# Patient Record
Sex: MALE | Race: WHITE | Employment: OTHER | URBAN - METROPOLITAN AREA
[De-identification: names, ages, dates, MRNs, and addresses within clinical notes are randomized per-mention and may not be internally consistent; named-entity substitution may affect disease eponyms.]

---

## 2018-06-19 ENCOUNTER — OFFICE VISIT (OUTPATIENT)
Dept: FAMILY MEDICINE | Facility: CLINIC | Age: 28
End: 2018-06-19
Payer: MEDICARE

## 2018-06-19 VITALS
RESPIRATION RATE: 18 BRPM | DIASTOLIC BLOOD PRESSURE: 81 MMHG | WEIGHT: 219.2 LBS | HEIGHT: 63 IN | BODY MASS INDEX: 38.84 KG/M2 | OXYGEN SATURATION: 100 % | HEART RATE: 99 BPM | TEMPERATURE: 97.5 F | SYSTOLIC BLOOD PRESSURE: 116 MMHG

## 2018-06-19 DIAGNOSIS — L60.3 DYSTROPHIC NAIL: Primary | ICD-10-CM

## 2018-06-19 DIAGNOSIS — Z71.1 WORRIED WELL: ICD-10-CM

## 2018-06-19 NOTE — MR AVS SNAPSHOT
"              After Visit Summary   6/19/2018    Darrell Bravo    MRN: [id number]           Patient Information     Date Of Birth          1990        Visit Information        Provider Department      6/19/2018 11:20 AM Isaac Torres MD [facility]        Today's Diagnoses     Dystrophic nail    -  1    Worried well           Follow-ups after your visit        Who to contact     Please call your clinic at [phone] to:    Ask questions about your health    Make or cancel appointments    Discuss your medicines    Learn about your test results    Speak to your doctor            Additional Information About Your Visit        Care EveryWhere ID     This is your Care EveryWhere ID. This could be used by other organizations to access your [facility] medical records  [id number]        Your Vitals Were     Pulse Temperature Respirations Height Pulse Oximetry BMI (Body Mass Index)    99 97.5  F (36.4  C) 18 5' 3\" (160 cm) 100% 38.83 kg/m2       Blood Pressure from Last 3 Encounters:   06/19/18 116/81   07/27/16 122/82   04/13/16 124/75    Weight from Last 3 Encounters:   06/19/18 219 lb 3.2 oz (99.4 kg)   07/27/16 220 lb 8 oz (100 kg)   04/13/16 181 lb 12.8 oz (82.5 kg)              We Performed the Following     TRIMMING DYSTROPHIC NAILS,ANY NUMBER        Primary Care Provider Office Phone # Fax #    Nallely Geovanna Hernandez [phone]       [facility] [address] [place] [address]        Equal Access to Services     BOBY RUBALCAVA AH: Hadii aad ku hadasho Soomaali, waaxda luqadaha, qaybta kaalmada adeegyada, waxluana mitchellin hayaan lyric solo la'aan . So [facility] [phone].    ATENCIÓN: Si habla mauricioañol, tiene a ingram disposición servicios gratuitos de asistencia lingüística. Llame al [phone].    We comply with applicable federal civil rights laws and [place] laws. We do not discriminate on the basis of race, color, national origin, age, disability, sex, sexual " orientation, or gender identity.            Thank you!     Thank you for choosing [facility]  for your care. Our goal is always to provide you with excellent care. Hearing back from our patients is one way we can continue to improve our services. Please take a few minutes to complete the written survey that you may receive in the mail after your visit with us. Thank you!             Your Updated Medication List - Protect others around you: Learn how to safely use, store and throw away your medicines at www.disposemymeds.org.          This list is accurate as of 6/19/18 11:59 PM.  Always use your most recent med list.                   Brand Name Dispense Instructions for use Diagnosis    loperamide 2 MG tablet    IMODIUM A-D    30 tablet    Start with 2 tabs (4 mg), then take one tab (2 mg) after each diarrheal stool.  Do not use more than  8 tabs (16 mg) per day.    Diarrhea       loratadine 10 MG tablet    CLARITIN    30 tablet    Take 1 tablet (10 mg) by mouth daily    Allergic rhinitis due to pollen

## 2018-06-19 NOTE — PROGRESS NOTES
Preceptor Attestation:  Patient's case reviewed and discussed with  Patient seen and discussed with the resident..  I agree with written assessment and plan of care.  Supervising Physician:  Geetha Manuel MD  [facility]

## 2021-05-24 ENCOUNTER — RECORDS - HEALTHEAST (OUTPATIENT)
Dept: ADMINISTRATIVE | Facility: CLINIC | Age: 31
End: 2021-05-24

## 2021-05-31 ENCOUNTER — RECORDS - HEALTHEAST (OUTPATIENT)
Dept: ADMINISTRATIVE | Facility: CLINIC | Age: 31
End: 2021-05-31

## 2022-02-09 ENCOUNTER — OFFICE VISIT (OUTPATIENT)
Dept: OTOLARYNGOLOGY | Facility: CLINIC | Age: 32
End: 2022-02-09
Payer: MEDICARE

## 2022-02-09 ENCOUNTER — OFFICE VISIT (OUTPATIENT)
Dept: AUDIOLOGY | Facility: CLINIC | Age: 32
End: 2022-02-09
Payer: MEDICARE

## 2022-02-09 VITALS — RESPIRATION RATE: 16 BRPM | OXYGEN SATURATION: 99 % | HEART RATE: 98 BPM

## 2022-02-09 DIAGNOSIS — H61.23 BILATERAL IMPACTED CERUMEN: Primary | ICD-10-CM

## 2022-02-09 DIAGNOSIS — Z53.9 ERRONEOUS ENCOUNTER--DISREGARD: Primary | ICD-10-CM

## 2022-02-09 PROCEDURE — 69210 REMOVE IMPACTED EAR WAX UNI: CPT | Performed by: OTOLARYNGOLOGY

## 2022-02-09 NOTE — LETTER
2/9/2022         RE: Darrell Bravo  [address] [place] [address]        Dear Colleague,    Thank you for referring your patient, Darrell Bravo, to the [facility]. Please see a copy of my visit note below.    Cerumen Removal - Patient here for ear cleaning. Denies ear pain, drainage, and infection.     Physical Exam and Procedure  Ears - On examination of the ears, I found that both ears were impacted with cerumen.  Therefore, I positioned the patient in the examination chair in a semi-supine position. I used the binocular surgical microscope to perform cerumen removal.  On the right side, I began by using a suction to gently lift the edges of the cerumen mass away from the walls of the external canal. Once I did this, I was able to pull away fragments of wax and debris. I also used an alligator. I removed all the wax and debri. The tympanic membrane was intact, no sign of perforation or middle ear effusion.    I turned my attention to the left side once again using the binocular surgical microscope to perform cerumen removal.  I began by using a suction to gently lift the edges of the cerumen mass away from the walls of the external canal.  Once I did this, I was able to suction away fragments of wax and debris. I removed all the wax and debri. The tympanic membrane was intact, no sign of perforation or middle ear effusion.    A/P - bilateral cerumen impaction. Both ears cleaned today in clinic. Return prn.         Again, thank you for allowing me to participate in the care of your patient.        Sincerely,        Alfonso Prado MD

## 2022-02-09 NOTE — PROGRESS NOTES
Cerumen Removal - Patient here for ear cleaning. Denies ear pain, drainage, and infection.     Physical Exam and Procedure  Ears - On examination of the ears, I found that both ears were impacted with cerumen.  Therefore, I positioned the patient in the examination chair in a semi-supine position. I used the binocular surgical microscope to perform cerumen removal.  On the right side, I began by using a suction to gently lift the edges of the cerumen mass away from the walls of the external canal. Once I did this, I was able to pull away fragments of wax and debris. I also used an alligator. I removed all the wax and debri. The tympanic membrane was intact, no sign of perforation or middle ear effusion.    I turned my attention to the left side once again using the binocular surgical microscope to perform cerumen removal.  I began by using a suction to gently lift the edges of the cerumen mass away from the walls of the external canal.  Once I did this, I was able to suction away fragments of wax and debris. I removed all the wax and debri. The tympanic membrane was intact, no sign of perforation or middle ear effusion.    A/P - bilateral cerumen impaction. Both ears cleaned today in clinic. Return prn.

## 2022-02-09 NOTE — PROGRESS NOTES
AUDIOLOGY REPORT:    Patient was referred to [facility] [place] [facility]iology from ENT by Dr. Prado for a hearing examination. Patient's mother reports decline in bilateral hearing, but feels it may be a wax build up. They were accompanied today by their brother and mother.    Testing:    Otoscopy:   Otoscopic exam indicates cerumen occlusion bilaterally       Patient was returned to ENT.     Claritza Coto, CCC-A  Licensed Audiologist  [place] #[id number]    02/09/22

## 2022-02-18 ENCOUNTER — OFFICE VISIT (OUTPATIENT)
Dept: PODIATRY | Facility: CLINIC | Age: 32
End: 2022-02-18
Payer: MEDICARE

## 2022-02-18 ENCOUNTER — TELEPHONE (OUTPATIENT)
Dept: PALLIATIVE MEDICINE | Facility: CLINIC | Age: 32
End: 2022-02-18

## 2022-02-18 VITALS
SYSTOLIC BLOOD PRESSURE: 132 MMHG | HEIGHT: 63 IN | DIASTOLIC BLOOD PRESSURE: 79 MMHG | WEIGHT: 190 LBS | BODY MASS INDEX: 33.66 KG/M2 | HEART RATE: 88 BPM

## 2022-02-18 DIAGNOSIS — B35.3 TINEA PEDIS OF BOTH FEET: ICD-10-CM

## 2022-02-18 DIAGNOSIS — M79.674 PAIN DUE TO ONYCHOMYCOSIS OF TOENAILS OF BOTH FEET: Primary | ICD-10-CM

## 2022-02-18 DIAGNOSIS — B35.1 PAIN DUE TO ONYCHOMYCOSIS OF TOENAILS OF BOTH FEET: Primary | ICD-10-CM

## 2022-02-18 DIAGNOSIS — M79.675 PAIN DUE TO ONYCHOMYCOSIS OF TOENAILS OF BOTH FEET: ICD-10-CM

## 2022-02-18 DIAGNOSIS — M79.674 PAIN DUE TO ONYCHOMYCOSIS OF TOENAILS OF BOTH FEET: ICD-10-CM

## 2022-02-18 DIAGNOSIS — B35.1 PAIN DUE TO ONYCHOMYCOSIS OF TOENAILS OF BOTH FEET: ICD-10-CM

## 2022-02-18 DIAGNOSIS — M79.675 PAIN DUE TO ONYCHOMYCOSIS OF TOENAILS OF BOTH FEET: Primary | ICD-10-CM

## 2022-02-18 PROCEDURE — 99203 OFFICE O/P NEW LOW 30 MIN: CPT | Performed by: PODIATRIST

## 2022-02-18 RX ORDER — CICLOPIROX 80 MG/ML
SOLUTION TOPICAL DAILY
Qty: 6.6 ML | Refills: 1 | Status: CANCELLED | OUTPATIENT
Start: 2022-02-18

## 2022-02-18 RX ORDER — CICLOPIROX 80 MG/ML
SOLUTION TOPICAL DAILY
Qty: 6.6 ML | Refills: 1 | Status: SHIPPED | OUTPATIENT
Start: 2022-02-18

## 2022-02-18 NOTE — PROGRESS NOTES
"PATIENT HISTORY:  Darrell Bravo is a 31 year old male with Down syndrome who presents with his caregiver to clinic with chief complaint of painful thickened elongated toenails on both feet.  The caregiver relates it is very difficult to trim the nails on their own due to therapy not cooperating and holding still as well as the thickness of the nails being difficult to trim with general nail tremors.  Any previous notes and studies that pertain to the patient's condition were reviewed.    Pertinent medical, surgical and family history was reviewed in Epic chart and include Down syndrome    Medications:   Current Outpatient Medications:      aspirin (ASA) 81 MG EC tablet, Take 81 mg by mouth, Disp: , Rfl:      ciclopirox (PENLAC) 8 % external solution, Apply topically daily Apply to affected nails daily.  Remove the residual build up weekly with nail polish remover or an [place] board., Disp: 6.6 mL, Rfl: 1     terbinafine (LAMISIL AT) 1 % external cream, Apply topically 2 times daily, Disp: 12 g, Rfl: 1     loperamide (IMODIUM A-D) 2 MG tablet, Start with 2 tabs (4 mg), then take one tab (2 mg) after each diarrheal stool.  Do not use more than  8 tabs (16 mg) per day. (Patient not taking: Reported on 2/18/2022), Disp: 30 tablet, Rfl: 0     loratadine (CLARITIN) 10 MG tablet, Take 1 tablet (10 mg) by mouth daily (Patient not taking: Reported on 2/18/2022), Disp: 30 tablet, Rfl: 11     Allergies:    Allergies   Allergen Reactions     No Known Drug Allergy        Vitals: /79   Pulse 88   Ht 1.6 m (5' 3\")   Wt 86.2 kg (190 lb)   BMI 33.66 kg/m    BMI= Body mass index is 33.66 kg/m .    LOWER EXTREMITY PHYSICAL EXAM    Dermatologic: The toenails appeared to be hypertrophic incurvated discolored x10.  The plantar skin on both feet appear to be xerotic and erythematous.     Vascular: DP & PT pulses are intact & regular on the right and left.   CFT and skin temperature is normal to the right and left lower " extremity.     Neurologic: Lower extremity sensation is intact to light touch.  No evidence of weakness in the right and left lower extremity.        Musculoskeletal: Patient is ambulatory without assistive device or brace.  No gross ankle deformity noted.  No foot or ankle joint effusion is noted.  Noted pes planus foot deformity bilaterally.           ASSESSMENT / PLAN:     ICD-10-CM    1. Pain due to onychomycosis of toenails of both feet  B35.1 ciclopirox (PENLAC) 8 % external solution    M79.675 Case Request: Debridement of nails under sedation    M79.674    2. Tinea pedis of both feet  B35.3 terbinafine (LAMISIL AT) 1 % external cream       I have explained to Darrell about the conditions.  We discussed the underlying contributing factors to the condition as well as treatment options along with expected length of recovery.  At this time, the patient was prescribed topical terbinafine 1% cream to be applied to the skin on the bottom of both feet twice daily for 2 weeks.  The patient was also prescribed topical Penlac 8% antifungal medicated solution to be applied to the toenails on both feet daily for up to 1 year.  Attempts were made to trim the nails in the office but was unable to due to noncompliance of the patient.  I recommended having the nails debrided under sedation.  This would allow me to safely debride the nails without risk of cutting the skin due to the patient's noncompliance.  Caregiver agreed and arrangements will be made to perform nail debridement under sedation at the [facility] surgery center.  The patient will need to obtain preoperative history and physical as well as Covid test before surgery.         Disclaimer: This note consists of symbols derived from keyboarding, dictation and/or voice recognition software. As a result, there may be errors in the script that have gone undetected. Please consider this when interpreting information found in this chart.       CHOCO Nova,  KIRA., AUSTIN.F.A.S.

## 2022-02-18 NOTE — LETTER
"    2/18/2022         RE: Darrell Bravo  [address] [place] [address]        Dear Colleague,    Thank you for referring your patient, Darrell Bravo, to the [facility] [place]. Please see a copy of my visit note below.    PATIENT HISTORY:  Darrell Bravo is a 31 year old male with Down syndrome who presents with his caregiver to clinic with chief complaint of painful thickened elongated toenails on both feet.  The caregiver relates it is very difficult to trim the nails on their own due to therapy not cooperating and holding still as well as the thickness of the nails being difficult to trim with general nail tremors.  Any previous notes and studies that pertain to the patient's condition were reviewed.    Pertinent medical, surgical and family history was reviewed in Epic chart and include Down syndrome    Medications:   Current Outpatient Medications:      aspirin (ASA) 81 MG EC tablet, Take 81 mg by mouth, Disp: , Rfl:      ciclopirox (PENLAC) 8 % external solution, Apply topically daily Apply to affected nails daily.  Remove the residual build up weekly with nail polish remover or an [place] board., Disp: 6.6 mL, Rfl: 1     terbinafine (LAMISIL AT) 1 % external cream, Apply topically 2 times daily, Disp: 12 g, Rfl: 1     loperamide (IMODIUM A-D) 2 MG tablet, Start with 2 tabs (4 mg), then take one tab (2 mg) after each diarrheal stool.  Do not use more than  8 tabs (16 mg) per day. (Patient not taking: Reported on 2/18/2022), Disp: 30 tablet, Rfl: 0     loratadine (CLARITIN) 10 MG tablet, Take 1 tablet (10 mg) by mouth daily (Patient not taking: Reported on 2/18/2022), Disp: 30 tablet, Rfl: 11     Allergies:    Allergies   Allergen Reactions     No Known Drug Allergy        Vitals: /79   Pulse 88   Ht 1.6 m (5' 3\")   Wt 86.2 kg (190 lb)   BMI 33.66 kg/m    BMI= Body mass index is 33.66 kg/m .    LOWER EXTREMITY PHYSICAL EXAM    Dermatologic: The toenails appeared to be hypertrophic " incurvated discolored x10.  The plantar skin on both feet appear to be xerotic and erythematous.     Vascular: DP & PT pulses are intact & regular on the right and left.   CFT and skin temperature is normal to the right and left lower extremity.     Neurologic: Lower extremity sensation is intact to light touch.  No evidence of weakness in the right and left lower extremity.        Musculoskeletal: Patient is ambulatory without assistive device or brace.  No gross ankle deformity noted.  No foot or ankle joint effusion is noted.  Noted pes planus foot deformity bilaterally.           ASSESSMENT / PLAN:     ICD-10-CM    1. Pain due to onychomycosis of toenails of both feet  B35.1 ciclopirox (PENLAC) 8 % external solution    M79.675 Case Request: Debridement of nails under sedation    M79.674    2. Tinea pedis of both feet  B35.3 terbinafine (LAMISIL AT) 1 % external cream       I have explained to Darrell about the conditions.  We discussed the underlying contributing factors to the condition as well as treatment options along with expected length of recovery.  At this time, the patient was prescribed topical terbinafine 1% cream to be applied to the skin on the bottom of both feet twice daily for 2 weeks.  The patient was also prescribed topical Penlac 8% antifungal medicated solution to be applied to the toenails on both feet daily for up to 1 year.  Attempts were made to trim the nails in the office but was unable to due to noncompliance of the patient.  I recommended having the nails debrided under sedation.  This would allow me to safely debride the nails without risk of cutting the skin due to the patient's noncompliance.  Caregiver agreed and arrangements will be made to perform nail debridement under sedation at the [facility] surgery center.  The patient will need to obtain preoperative history and physical as well as Covid test before surgery.         Disclaimer: This note consists of symbols derived  from keyboarding, dictation and/or voice recognition software. As a result, there may be errors in the script that have gone undetected. Please consider this when interpreting information found in this chart.       CHOCO Nova D.P.M., F.A.C.F.A.S.        Again, thank you for allowing me to participate in the care of your patient.        Sincerely,        Jason Nova DPM

## 2022-02-18 NOTE — TELEPHONE ENCOUNTER
Prior Authorization Retail Medication Request    Medication/Dose: ciclopirox (PENLAC) 8 % external solution  ICD code (if different than what is on RX): Pain due to onychomycosis of toenails of both feet [B35.1, M79.675, M79.674]  - Primary      Member ID: [id number]     [facility] Ph: [phone]     Benefit plan: [id number] Ph: [phone]     Group number: [id number]

## 2022-02-18 NOTE — PATIENT INSTRUCTIONS
Surgery Scheduling   You have elected to proceed with surgery for debridement of fungal nails under sedation.  Dr. Nova performs his surgeries on Tuesdays at [facility].   To schedule your surgery date please call [phone].  If no one answers, please leave a message with a good time for our staff to call you back.    - Please have a date in mind for your surgery, you can feel free to leave that date on the message, and we will schedule and call back to confirm.   - You can expect to receive a call back the same day or on the next business day from Dr. Nova s team to assist in the scheduling.   - We will assist to schedule the date of your surgery.    o The time will be determined a few days ahead of time.    o You can expect a call from Same Day Surgery 2-3 days ahead of time with specific instructions for what time to arrive at the hospital as well as any other preparations you should take prior to surgery.  - You may need to obtain a pre-operative physical from a primary medical provider.    o This must be done within 30 days of your surgery date.  - You will also need a preoperative COVID test.    o This is typically done 4 days before your scheduled surgery date.    o As most surgeries are scheduled on Tuesday, this means the test will need to be performed on the Friday before surgery.    o There are several centers where you can have the tests taken and you may need to go to the center that has availability.    - We will also schedule your first post-operative appointment for a bandage and wound check for the Monday following your surgery at the [place] location.  - You may be non-weight bearing for a period of up to 6 weeks.  Options for this include: (Please indicate which you would prefer so we can provide you with an order and instructions)  o Crutches  o Walker  o Roll-a-bout knee walker.  - If you will need paperwork filled out for your employer you may drop those off at the  clinic directly or you may have those faxed to us at [phone].  Please indicate on the form the date you would like the FMLA to begin if it will not be your surgery date.    The forms are typically filled out for up to 12 weeks, however you may be cleared to return prior to that time depending on your individual healing and job requirements.    GETTING READY FOR YOUR SURGERY    ONE-THREE WEEKS BEFORE  1. See your Family Doctor or Primary Care Provider for a Preoperative History and Physical.    2. Please see pre-surgical medications below to which medications need to be stopped before surgery and when.    SAME DAY SURGERY PATIENTS  1. You will need a family member of friend to drive you home. If you do not have one the surgery will be cancelled or rescheduled.  2. You will need a responsible adult to stay with you that night after the surgery.       We will ask this person to listen to some instructions before you leave the hospital.    DAY BEFORE SURGERY  1. DO NOT EAT OR DRINK ANYTHING AFTER MIDNIGHT THE NIGHT  BEFORE YOUR SURGERY!   2. DO NOT DRINK ALCOHOL.  3. Do not take over the counter drugs.  4. Some people need to have blood tests at the hospital. If you need blood tests, we will tell you in advance.  5. Take medications as directed by your doctor. You may take these with a small amount of water.  6. Do not chew gum, chew tobacco, or suck on hard candy the day of surgery.  7. Bring your insurance cards, a list of your medicines and co-pays you might need. Leave jewelry and other valuables at home.      PRESURGICAL MEDICATIONS:  Certain prescription, over-the-counter, and herbal medications interfere with healing after an operation. The main concern relates to medications that increase bleeding at the surgical site. Excess blood under the incision results in poor wound healing, excess pain, increased scarring, and a higher risk of infection.    Some medications slow the healing process of bone.  Medications can also interfere with the anesthesia drugs that keep you asleep during the operation. It is important to ensure that these medications are out of your system prior to the operation. The list below details a number of medications that are of concern. Pay special attention to how long you should avoid these medications before your operation. Please note that this list is not complete. You should ask your surgeon or pharmacist if you are uncertain about other medications. Any herbal supplement not listed should be discontinued at least one week prior to surgery.    Aspirin: Hold for one week prior to surgery and restart the day after surgery. This over the counter medication promotes bleeding.    Motrin / Ibuprofen / Aleve / Advil / NSAIDS:  Stop one week prior to surgery. These medications affect bleeding and may cause delay in bone healing. Avoid taking these medications for six weeks after bone surgeries. Other procedures may allow you to restart sooner than 6 weeks after surgery.    Coumadin / Plavix: Your primary care provider will manage Coumadin in relation to surgery. Coumadin may result in excessive bleeding and may be adjusted before and after surgery.    Enbriel: Stop two weeks prior to surgery and restart two weeks after surgery. This medication can effect soft tissue healing and increases the risk of infection.    Remicade: Stop 8-12 weeks before surgery and restart two weeks after surgery. This medication can affect soft tissue healing and increases the risk of infection.    Humira: Stop 4 weeks before surgery and restart two weeks after surgery. This medication can affect soft tissue healing and increases the risk of infection.    Methotrexate: Stop one dose prior to surgery. This medication will be restarted when the wound appears to be healing well. Please ask your surgeon about restarting this medication when you are being seen in the office for wound checks.    Kava: Stop at least one  day prior to surgery and may restart one day after surgery. Kava may increase the sedative effect of anesthetics that are given during the operation. Kava can also increase bleeding at the surgical site.    Ephedra (ma archibald): Stop at least one day prior to surgery and may restart one day after surgery.  Ephedra may increase the risk of heart attack and stroke. This medication can also increase bleeding at the surgical site.    Aimee's Wort: Stop at least five days before surgery and may restart one day after surgery. [place]'s wort may diminish the effects of several drugs that are given during surgery.    Ginseng: Stop at least one week prior to surgery and may restart one day after surgery.  Ginseng lowers blood sugar and may increase bleeding at the surgical site.    Ginkgo: Stop 36 hours before surgery and may restart one day after surgery. Ginkgo may increase bleeding at the surgical site.    Garlic: Stop at least one week prior to surgery and may restart one day after. Garlic may increase bleeding at the surgery site.    Valerian: Do a slow steady decrease in your daily dose over a period of 2-3 weeks before surgery to decrease the chance of withdrawal symptoms. Valerian may increase the sedative effect of anesthetics given during the operation.    Echinacea: There is no data on stopping echinacea prior to surgery. This medication though can be associated with allergic reactions and suppression of your immune system.    Vitamin E, Omega-3, Flax, Fish Oil, Glucosamine and Chondroitin: Stop 2 weeks prior to surgery and may restart one day after. These herbal medications can increase risk of bleeding at surgical site.       [facility], [facility].                    Aurea Gray RN                                [phone]                                                Serving [facility]  [facility]    [facility] Shantelle Rodríguez [phone]  [email or web address]  [place]/ [place]/ [place] on [employer]     [facility] Meaghan Gilman         RN, [facility], [facility] [phone] [email or web address] [place]:  [place], [place], [place], [place]     [facility] Zonia Mcintyre         RN, CWOCN, [facility] [phone] [email or web address]  Clinic: [facility]     Home visits: [facility]     [facility] Jada BOWERS, RN, [facility] [phone] [email or web address] Clinic:  [facility]  Home visits:  [facility]      [facility] Nurys Garcia RN, [facility] [phone] [email or web address] [facility]     [facility] Meera Wiseman RN ,BSN,[facility] [phone]    [email or web address] [place], [facility], [place], [place], [place], [place], [place]     Sanjuanita Puckett   [phone]  [email or web address]  Clinic: [facility] [place]  Home visits: [place] / [facility]

## 2022-02-18 NOTE — NURSING NOTE
"Chief Complaint   Patient presents with     Consult     BL feet toenail fungus       Initial /79   Pulse 88   Ht 1.6 m (5' 3\")   Wt 86.2 kg (190 lb)   BMI 33.66 kg/m   Estimated body mass index is 33.66 kg/m  as calculated from the following:    Height as of this encounter: 1.6 m (5' 3\").    Weight as of this encounter: 86.2 kg (190 lb).  Medications and allergies reviewed.      Samia MURCIA MA    "

## 2022-02-27 NOTE — TELEPHONE ENCOUNTER
PA Initiation    Medication: ciclopirox (PENLAC) 8 % external solution   Insurance Company: [employer] - Phone [phone] Fax [phone]  Pharmacy Filling the Rx: [id number] - [address] [place] AT [facility] [address]  Filling Pharmacy Phone: [phone]  Filling Pharmacy Fax: [phone]  Start Date: 2/27/2022

## 2022-02-28 NOTE — TELEPHONE ENCOUNTER
PRIOR AUTHORIZATION DENIED    Medication: ciclopirox (PENLAC) 8 % external solution--DENIED    Denial Date: 2/27/2022    Denial Rational: Patient needs to try and fail 2 preferred medications      Appeal Information:

## 2022-03-01 NOTE — TELEPHONE ENCOUNTER
Routing PA denial to ordering provider to review and advise on next steps     Denial Rational: Patient needs to try and fail 2 preferred medications    Stefanie Foreman RN, BSN, CMSRN  RN Care Coordinator  [facility]

## 2022-03-03 DIAGNOSIS — Z11.59 ENCOUNTER FOR SCREENING FOR OTHER VIRAL DISEASES: Primary | ICD-10-CM

## 2022-03-06 ENCOUNTER — LAB (OUTPATIENT)
Dept: LAB | Facility: CLINIC | Age: 32
End: 2022-03-06
Payer: MEDICARE

## 2022-03-06 DIAGNOSIS — Z11.59 ENCOUNTER FOR SCREENING FOR OTHER VIRAL DISEASES: ICD-10-CM

## 2022-03-06 PROCEDURE — U0005 INFEC AGEN DETEC AMPLI PROBE: HCPCS

## 2022-03-06 PROCEDURE — U0003 INFECTIOUS AGENT DETECTION BY NUCLEIC ACID (DNA OR RNA); SEVERE ACUTE RESPIRATORY SYNDROME CORONAVIRUS 2 (SARS-COV-2) (CORONAVIRUS DISEASE [COVID-19]), AMPLIFIED PROBE TECHNIQUE, MAKING USE OF HIGH THROUGHPUT TECHNOLOGIES AS DESCRIBED BY CMS-2020-01-R: HCPCS

## 2022-03-07 ENCOUNTER — ANESTHESIA EVENT (OUTPATIENT)
Dept: SURGERY | Facility: CLINIC | Age: 32
End: 2022-03-07
Payer: MEDICARE

## 2022-03-07 LAB — SARS-COV-2 RNA RESP QL NAA+PROBE: NEGATIVE

## 2022-03-07 NOTE — ANESTHESIA PREPROCEDURE EVALUATION
Anesthesia Pre-Procedure Evaluation    Patient: Darrell Bravo   MRN: [id number] : 1990        Procedure : Procedure(s):  Debridement of nails under sedation          No past medical history on file.   No past surgical history on file.   Allergies   Allergen Reactions     No Known Drug Allergy       Social History     Tobacco Use     Smoking status: Never Smoker     Smokeless tobacco: Never Used   Substance Use Topics     Alcohol use: No     Alcohol/week: 0.0 standard drinks      Wt Readings from Last 1 Encounters:   22 86.2 kg (190 lb)        Anesthesia Evaluation            ROS/MED HX  ENT/Pulmonary:       Neurologic: Comment: Down syndrome    (+) Developmental delay,     Cardiovascular:       METS/Exercise Tolerance: >4 METS    Hematologic:       Musculoskeletal:       GI/Hepatic:       Renal/Genitourinary:       Endo:     (+) Obesity,     Psychiatric/Substance Use:       Infectious Disease:       Malignancy:       Other:            Physical Exam    Airway        Mallampati: III   TM distance: > 3 FB   Neck ROM: full   Mouth opening: > 3 cm    Respiratory Devices and Support         Dental  no notable dental history         Cardiovascular             Pulmonary                   OUTSIDE LABS:  CBC: No results found for: WBC, HGB, HCT, PLT  BMP:   Lab Results   Component Value Date     2016    POTASSIUM 4.7 2016    CHLORIDE 107 2016    BUN 14 2016    CR 0.75 2016    GLC 59 (LL) 2016     COAGS: No results found for: PTT, INR, FIBR  POC: No results found for: BGM, HCG, HCGS  HEPATIC:   Lab Results   Component Value Date    ALBUMIN 3.3 (L) 2016    PROTTOTAL 6.2 2016    BILITOTAL 0.5 2016     OTHER:   Lab Results   Component Value Date    JERO 9.4 2016       Anesthesia Plan    ASA Status:  2   NPO Status:  NPO Appropriate    Anesthesia Type: MAC.     - Reason for MAC: immobility needed              Consents    Anesthesia Plan(s) and associated  risks, benefits, and realistic alternatives discussed. Questions answered and patient/representative(s) expressed understanding.    - Discussed:     - Discussed with:  Patient         Postoperative Care    Pain management: Oral pain medications, Multi-modal analgesia.   PONV prophylaxis: Ondansetron (or other 5HT-3)     Comments:           H&P reviewed: Unable to attach H&P to encounter due to EHR limitations. H&P Update: appropriate H&P reviewed, patient examined. No interval changes since H&P (within 30 days).         Paige Mcintyre APRN CRNA

## 2022-03-08 ENCOUNTER — ANESTHESIA (OUTPATIENT)
Dept: SURGERY | Facility: CLINIC | Age: 32
End: 2022-03-08
Payer: MEDICARE

## 2022-03-08 ENCOUNTER — HOSPITAL ENCOUNTER (OUTPATIENT)
Facility: CLINIC | Age: 32
Discharge: HOME | End: 2022-03-08
Attending: PODIATRIST | Admitting: PODIATRIST
Payer: MEDICARE

## 2022-03-08 VITALS
HEIGHT: 63 IN | OXYGEN SATURATION: 99 % | TEMPERATURE: 98.1 F | SYSTOLIC BLOOD PRESSURE: 115 MMHG | BODY MASS INDEX: 33.66 KG/M2 | WEIGHT: 190 LBS | RESPIRATION RATE: 14 BRPM | DIASTOLIC BLOOD PRESSURE: 69 MMHG | HEART RATE: 72 BPM

## 2022-03-08 PROCEDURE — 250N000009 HC RX 250: Performed by: NURSE ANESTHETIST, CERTIFIED REGISTERED

## 2022-03-08 PROCEDURE — 999N000141 HC STATISTIC PRE-PROCEDURE NURSING ASSESSMENT: Performed by: PODIATRIST

## 2022-03-08 PROCEDURE — 370N000017 HC ANESTHESIA TECHNICAL FEE, PER MIN: Performed by: PODIATRIST

## 2022-03-08 PROCEDURE — 250N000011 HC RX IP 250 OP 636: Performed by: NURSE ANESTHETIST, CERTIFIED REGISTERED

## 2022-03-08 PROCEDURE — 360N000075 HC SURGERY LEVEL 2, PER MIN: Performed by: PODIATRIST

## 2022-03-08 PROCEDURE — 258N000003 HC RX IP 258 OP 636: Performed by: NURSE ANESTHETIST, CERTIFIED REGISTERED

## 2022-03-08 PROCEDURE — 710N000012 HC RECOVERY PHASE 2, PER MINUTE: Performed by: PODIATRIST

## 2022-03-08 PROCEDURE — 250N000013 HC RX MED GY IP 250 OP 250 PS 637: Performed by: NURSE ANESTHETIST, CERTIFIED REGISTERED

## 2022-03-08 PROCEDURE — 11721 DEBRIDE NAIL 6 OR MORE: CPT | Performed by: PODIATRIST

## 2022-03-08 RX ORDER — ONDANSETRON 4 MG/1
4 TABLET, ORALLY DISINTEGRATING ORAL EVERY 30 MIN PRN
Status: DISCONTINUED | OUTPATIENT
Start: 2022-03-08 | End: 2022-03-08 | Stop reason: HOSPADM

## 2022-03-08 RX ORDER — LIDOCAINE HYDROCHLORIDE 10 MG/ML
INJECTION, SOLUTION INFILTRATION; PERINEURAL PRN
Status: DISCONTINUED | OUTPATIENT
Start: 2022-03-08 | End: 2022-03-08

## 2022-03-08 RX ORDER — LIDOCAINE 40 MG/G
CREAM TOPICAL
Status: DISCONTINUED | OUTPATIENT
Start: 2022-03-08 | End: 2022-03-08 | Stop reason: HOSPADM

## 2022-03-08 RX ORDER — PROPOFOL 10 MG/ML
INJECTION, EMULSION INTRAVENOUS CONTINUOUS PRN
Status: DISCONTINUED | OUTPATIENT
Start: 2022-03-08 | End: 2022-03-08

## 2022-03-08 RX ORDER — SODIUM CHLORIDE, SODIUM LACTATE, POTASSIUM CHLORIDE, CALCIUM CHLORIDE 600; 310; 30; 20 MG/100ML; MG/100ML; MG/100ML; MG/100ML
INJECTION, SOLUTION INTRAVENOUS CONTINUOUS
Status: DISCONTINUED | OUTPATIENT
Start: 2022-03-08 | End: 2022-03-08 | Stop reason: HOSPADM

## 2022-03-08 RX ORDER — MEPERIDINE HYDROCHLORIDE 25 MG/ML
12.5 INJECTION INTRAMUSCULAR; INTRAVENOUS; SUBCUTANEOUS
Status: DISCONTINUED | OUTPATIENT
Start: 2022-03-08 | End: 2022-03-08 | Stop reason: HOSPADM

## 2022-03-08 RX ORDER — ONDANSETRON 2 MG/ML
4 INJECTION INTRAMUSCULAR; INTRAVENOUS EVERY 30 MIN PRN
Status: DISCONTINUED | OUTPATIENT
Start: 2022-03-08 | End: 2022-03-08 | Stop reason: HOSPADM

## 2022-03-08 RX ORDER — MAGNESIUM SULFATE HEPTAHYDRATE 40 MG/ML
2 INJECTION, SOLUTION INTRAVENOUS ONCE
Status: DISCONTINUED | OUTPATIENT
Start: 2022-03-08 | End: 2022-03-08 | Stop reason: HOSPADM

## 2022-03-08 RX ORDER — PROPOFOL 10 MG/ML
INJECTION, EMULSION INTRAVENOUS PRN
Status: DISCONTINUED | OUTPATIENT
Start: 2022-03-08 | End: 2022-03-08

## 2022-03-08 RX ORDER — ACETAMINOPHEN 325 MG/1
975 TABLET ORAL ONCE
Status: COMPLETED | OUTPATIENT
Start: 2022-03-08 | End: 2022-03-08

## 2022-03-08 RX ORDER — GABAPENTIN 300 MG/1
300 CAPSULE ORAL
Status: COMPLETED | OUTPATIENT
Start: 2022-03-08 | End: 2022-03-08

## 2022-03-08 RX ORDER — DIMENHYDRINATE 50 MG/ML
25 INJECTION, SOLUTION INTRAMUSCULAR; INTRAVENOUS
Status: DISCONTINUED | OUTPATIENT
Start: 2022-03-08 | End: 2022-03-08 | Stop reason: HOSPADM

## 2022-03-08 RX ADMIN — SODIUM CHLORIDE, POTASSIUM CHLORIDE, SODIUM LACTATE AND CALCIUM CHLORIDE 1000 ML: 600; 310; 30; 20 INJECTION, SOLUTION INTRAVENOUS at 13:07

## 2022-03-08 RX ADMIN — PROPOFOL 20 MG: 10 INJECTION, EMULSION INTRAVENOUS at 14:40

## 2022-03-08 RX ADMIN — PROPOFOL 30 MG: 10 INJECTION, EMULSION INTRAVENOUS at 14:42

## 2022-03-08 RX ADMIN — GABAPENTIN 300 MG: 300 CAPSULE ORAL at 12:41

## 2022-03-08 RX ADMIN — PROPOFOL 100 MCG/KG/MIN: 10 INJECTION, EMULSION INTRAVENOUS at 14:40

## 2022-03-08 RX ADMIN — LIDOCAINE HYDROCHLORIDE 0.1 ML: 10 INJECTION, SOLUTION EPIDURAL; INFILTRATION; INTRACAUDAL; PERINEURAL at 13:08

## 2022-03-08 RX ADMIN — PROPOFOL 20 MG: 10 INJECTION, EMULSION INTRAVENOUS at 14:43

## 2022-03-08 RX ADMIN — LIDOCAINE HYDROCHLORIDE 40 MG: 10 INJECTION, SOLUTION INFILTRATION; PERINEURAL at 14:40

## 2022-03-08 RX ADMIN — ACETAMINOPHEN 975 MG: 325 TABLET, FILM COATED ORAL at 12:41

## 2022-03-08 RX ADMIN — MIDAZOLAM 2 MG: 1 INJECTION INTRAMUSCULAR; INTRAVENOUS at 14:35

## 2022-03-08 NOTE — ANESTHESIA CARE TRANSFER NOTE
Patient: Darrell Bravo    Procedure: Procedure(s):  Debridement of nails under sedation       Diagnosis: Pain due to onychomycosis of toenails of both feet [B35.1, M79.675, M79.674]  Diagnosis Additional Information: No value filed.    Anesthesia Type:   MAC     Note:    Oropharynx: oropharynx clear of all foreign objects and spontaneously breathing  Level of Consciousness: awake  Oxygen Supplementation: room air    Independent Airway: airway patency satisfactory and stable  Dentition: dentition unchanged  Vital Signs Stable: post-procedure vital signs reviewed and stable  Report to RN Given: handoff report given  Patient transferred to: Phase II    Handoff Report: Identifed the Patient, Identified the Reponsible Provider, Reviewed the pertinent medical history, Discussed the surgical course, Reviewed Intra-OP anesthesia mangement and issues during anesthesia, Set expectations for post-procedure period and Allowed opportunity for questions and acknowledgement of understanding      Vitals:  Vitals Value Taken Time   BP 90/58 03/08/22 1517   Temp 36.7  C (98.1  F) 03/08/22 1458   Pulse 67 03/08/22 1517   Resp 14 03/08/22 1515   SpO2 97 % 03/08/22 1527   Vitals shown include unvalidated device data.    Electronically Signed By: ISHA Rivera CRNA  March 8, 2022  3:28 PM

## 2022-03-08 NOTE — ANESTHESIA POSTPROCEDURE EVALUATION
Patient: Darrell Bravo    Procedure: Procedure(s):  Debridement of nails under sedation       Anesthesia Type:  MAC    Note:  Disposition: Outpatient   Postop Pain Control: Uneventful            Sign Out: Well controlled pain   PONV: No   Neuro/Psych: Uneventful            Sign Out: Acceptable/Baseline neuro status   Airway/Respiratory: Uneventful            Sign Out: Acceptable/Baseline resp. status   CV/Hemodynamics: Uneventful            Sign Out: Acceptable CV status; No obvious hypovolemia; No obvious fluid overload   Other NRE: NONE   DID A NON-ROUTINE EVENT OCCUR? No           Last vitals:  Vitals Value Taken Time   BP 91/53 03/08/22 1458   Temp 36.7  C (98.1  F) 03/08/22 1458   Pulse 74 03/08/22 1458   Resp 16 03/08/22 1458   SpO2 98 % 03/08/22 1458       Electronically Signed By: Sujata Blake CRNA, ISHA CORADO  March 8, 2022  3:10 PM

## 2022-03-08 NOTE — BRIEF OP NOTE
[facility]    Brief Operative Note    Pre-operative diagnosis: Pain due to onychomycosis of toenails of both feet [B35.1, M79.675, M79.674]  Post-operative diagnosis Same as pre-operative diagnosis    Procedure: Procedure(s):  Debridement of nails under sedation  Surgeon: Surgeon(s) and Role:     * Jason Nova, CHERRI - Primary  Anesthesia: Monitor Anesthesia Care   Estimated Blood Loss: Minimal    Drains: None  Specimens: * No specimens in log *  Findings:   None.  Complications: None.  Implants: * No implants in log *

## 2022-03-08 NOTE — ANESTHESIA POSTPROCEDURE EVALUATION
Patient: Darrell Bravo    Procedure: Procedure(s):  Debridement of nails under sedation       Anesthesia Type:  MAC    Note:  Disposition: Outpatient   Postop Pain Control: Uneventful            Sign Out: Well controlled pain   PONV: No   Neuro/Psych: Uneventful            Sign Out: Acceptable/Baseline neuro status   Airway/Respiratory: Uneventful            Sign Out: Acceptable/Baseline resp. status   CV/Hemodynamics: Uneventful            Sign Out: Acceptable CV status; No obvious hypovolemia; No obvious fluid overload   Other NRE: NONE   DID A NON-ROUTINE EVENT OCCUR? No           Last vitals:  Vitals Value Taken Time   BP 90/58 03/08/22 1517   Temp 36.7  C (98.1  F) 03/08/22 1458   Pulse 67 03/08/22 1517   Resp 14 03/08/22 1515   SpO2 98 % 03/08/22 1528   Vitals shown include unvalidated device data.    Electronically Signed By: ISHA Rivera CRNA  March 8, 2022  3:28 PM

## 2022-03-08 NOTE — DISCHARGE INSTRUCTIONS
Same Day Surgery Discharge Instructions  Special Precautions After Surgery - Adult    1. It is not unusual to feel lightheaded or faint, up to 24 hours after surgery or while taking pain medication.  If you have these symptoms; sit for a few minutes before standing and have someone assist you when getting up.  2. You should rest and relax for the next 24 hours and must have someone stay with you for at least 24 hours after your discharge.  3. DO NOT DRIVE any vehicle or operate mechanical equipment for 24 hours following the end of your surgery.  DO NOT DRIVE while taking narcotic pain medications that have been prescribed by your physician.  If you had a limb operated on, you must be able to use it fully to drive.  4. DO NOT drink alcoholic beverages for 24 hours following surgery or while taking prescription pain medication.  5. Drink clear liquids (apple juice, ginger ale, broth, 7-Up, etc.).  Progress to your regular diet as you feel able.  6. Any questions call your physician and do not make important decisions for 24 hours.         __________________________________________________________________________________________________________________________________  IMPORTANT NUMBERS:    [facility] [place]:  [phone], [phone]  Pharmacy:  [phone]  Same Day Surgery:  [phone], Monday - Friday until 8:30 p.m.  Emergency Room:  [phone]                                                                       [facility]:  [phone] option 1  [facility]:  [phone]

## 2022-03-08 NOTE — OP NOTE
PREOPERATIVE DIAGNOSIS: 1. Painful toenails secondary to onychomycosis to all ten toes.  POSTOPERATIVE DIAGNOSIS: 1. Painful toenails secondary to onychomycosis to all ten toes.  PROCEDURE: 1. Nail debridement under sedation  PATHOLOGY: None.   ANESTHESIA: Monitored anesthesia care  ESTIMATED BLOOD LOSS: Less than 10 mL   INDICATIONS FOR PROCEDURE: Darrell Bravo is a 32 year old male with a painful thickened toenails on both feet.  The patient has Downs Syndrome and was uncooperative in clinic to having his toenails trimmed.  The patient's father consented for nail debridement under sedation.    PROCEDURE IN DETAIL: The patient was brought into the operating room. After sedation was obtained by anesthesia, the nails were sharply debrided times 10.  There was bleeding noted to the left fourth toe which was bandaged.  The patient tolerated the procedure with no complications.    CAROL CORTEZ DPM, FACFAS

## 2022-03-08 NOTE — ANESTHESIA CARE TRANSFER NOTE
Patient: Darrell Bravo    Procedure: Procedure(s):  Debridement of nails under sedation       Diagnosis: Pain due to onychomycosis of toenails of both feet [B35.1, M79.675, M79.674]  Diagnosis Additional Information: No value filed.    Anesthesia Type:   MAC     Note:    Oropharynx: oropharynx clear of all foreign objects and spontaneously breathing  Level of Consciousness: drowsy  Oxygen Supplementation: nasal cannula  Level of Supplemental Oxygen (L/min / FiO2): 2  Independent Airway: airway patency satisfactory and stable  Dentition: dentition unchanged  Vital Signs Stable: post-procedure vital signs reviewed and stable  Report to RN Given: handoff report given  Patient transferred to: Phase II    Handoff Report: Identifed the Patient, Identified the Reponsible Provider, Reviewed the pertinent medical history, Discussed the surgical course, Reviewed Intra-OP anesthesia mangement and issues during anesthesia, Set expectations for post-procedure period and Allowed opportunity for questions and acknowledgement of understanding      Vitals:  Vitals Value Taken Time   BP     Temp     Pulse     Resp     SpO2         Electronically Signed By: ISHA Rivera CRNA  March 8, 2022  2:58 PM

## 2022-10-15 NOTE — PROGRESS NOTES
"  SUBJECTIVE:                                                    Darrell Bravo is a 28 year old year old male who presents to clinic today for the following health issues:    Concern for Hernia:  Left sided upper abdomen, hard mass noted ~1 month ago. Non tender. No associated nausea, vomiting, diarrhea, constipation, bloody stools, melena. No fevers, chills, or abdominal pain. Have not seen anyone else for this. No inguinal or bc-umbilical masses. No previous abdominal surgeries.    Long toe nails:   Has not clipped them in \"12 years\". Father would like to have them cut today. Patient denies pain, itching, etc.    Patient is an established patient of this clinic.  ----------------------------------------------------------------------------------------------------------------------  Patient Active Problem List   Diagnosis     Health Care Home     Avulsion of nail plate, partial     ETD (eustachian tube dysfunction)     Dermatophytosis of nail     Down's syndrome     No past surgical history on file.    Social History   Substance Use Topics     Smoking status: Never Smoker     Smokeless tobacco: Never Used     Alcohol use No     Family History   Problem Relation Age of Onset     Depression Mother      Asthma Mother      Thyroid Disease Mother      Hypothroidizm     Other Cancer Maternal Grandmother      sinus     Coronary Artery Disease Maternal Grandfather      Anxiety Disorder Brother      Pt. Brother     Thyroid Disease Brother      Uncle Hypothroidizm     Breast Cancer Sister      Coronary Artery Disease Father      Diabetes No family hx of      Hypertension No family hx of      Hyperlipidemia No family hx of      Cerebrovascular Disease No family hx of      Colon Cancer No family hx of      Prostate Cancer No family hx of      Mental Illness No family hx of      Substance Abuse No family hx of      Anesthesia Reaction No family hx of      Osteoperosis No family hx of      Genetic Disorder No family hx of      " "Obesity No family hx of          Problem list and past medical, surgical, social, and family histories reviewed & adjusted, as indicated.    Current Outpatient Prescriptions   Medication Sig Dispense Refill     loperamide (IMODIUM A-D) 2 MG tablet Start with 2 tabs (4 mg), then take one tab (2 mg) after each diarrheal stool.  Do not use more than  8 tabs (16 mg) per day. 30 tablet 0     loratadine (CLARITIN) 10 MG tablet Take 1 tablet (10 mg) by mouth daily 30 tablet 11     Medication list reviewed and updated as indicated.    Allergies   Allergen Reactions     No Known Drug Allergy      Allergies reviewed and updated as indicated.  ----------------------------------------------------------------------------------------------------------------------  ROS:  Constitutional, HEENT, cardiovascular, pulmonary, GI, musculoskeletal, neuro, skin, and psych systems are negative, except as otherwise noted.    OBJECTIVE:     /81  Pulse 99  Temp 97.5  F (36.4  C)  Resp 18  Ht 5' 3\" (160 cm)  Wt 219 lb 3.2 oz (99.4 kg)  SpO2 100%  BMI 38.83 kg/m2  Body mass index is 38.83 kg/(m^2).  General: Obese male Appears well and in no acute distress.  Cardiovascular: Regular rate and rhythm, normal S1 and S2 without murmur. No extra heartsounds or friction rub. Radial pulses present and equal bilaterally.  Respiratory: Lungs clear to auscultation bilaterally. No wheezing or crackles. No prolonged expiration. Symmetrical chest rise.  GI/Rectal: Location of mass consistent with a left rib ribs 7-10. Soft, non-tender abdomen. No hepatosplenomegaly. Normal active bowel sounds.  Derm: Dystrophic nails bilaterally. Many of them > 1 in long. No suspicious lesions or rashes    Diagnostic Test Results:  none     ASSESSMENT/PLAN:   1. Worried well: Hernia consistent in location with ribs. Not typical location or presentation of hernia. Reassure patient and family that this is just his lower ribs.    2. Dystrophic nails: Trimmed for " patient.    Schedule follow-up appointment PRN for new concerns.    There are no discontinued medications.    Isaac Torres MD  [facility] Resident  Pager# [phone]    Precepted with: Tawana Manuel MD    Options for treatment and follow-up care were reviewed with the patient and/or guardian. Darrell Bravo and/or guardian engaged in the decision making process and verbalized understanding of the options discussed and agreed with the final plan    This chart is completed utilizing dictation software; typos and/or incorrect word substitutions may unintentionally occur.     The Following is for Coding Purposes Only    Dx Type # This visit   Self-Limited                  (1 pt ea) 2   Established, Stable      (1 pt ea) 0   Established, Worse      (2 pt ea) 0   New, Simple                 (3 pt ea) 0   New, Further Work-Up (4 pt ea) 0       Total Points 2 - Low       Data Reviewed    Decision to Obtain Records                 (1 pt) 0   Review/Summarize Old Records         (2 pt) 0   Order/Review Labs                              (1 pt) 0   Order/Review Radiology                      (1 pt) 0   Order/Review Medical Tests                (1 pt) 0   Independent Review of EKG/XRay (2 pt ea) 0       Total Points 0 - straightforward       Risk    1       One Minor Problem No   Basic Labs / Imaging / EKG No   Supportive Cares Yes   2       2+ Minor / Stable Chronic / Simple Acute Problem   Yes   Unstressed Test (Biopsy, PFT's, etc) No   OTC Meds / PT/OT No   3       Complicated Acute / Worse Chronic / 2+ Stable / Undiagnosed  No   Stress Test and Endoscopies No   Prescription Drugs or Treatment of Closed Injury No   4       Life Threatening Condition No   Rx With Monitoring / De-Escalate Care For Poor Prognosis  No   Level 2        119

## (undated) DEVICE — BNDG COBAN 2"X5YDS UNSTERILE 2082

## (undated) DEVICE — PREP PAD ALCOHOL 6818

## (undated) DEVICE — SOL WATER IRRIG 1000ML BOTTLE 07139-09

## (undated) DEVICE — DRAPE SHEET REV FOLD 3/4 9349

## (undated) DEVICE — DRAPE EXTREMITY BILAT 29416

## (undated) DEVICE — DECANTER VIAL 2006S

## (undated) DEVICE — GLOVE PROTEXIS W/NEU-THERA 8.0  2D73TE80

## (undated) DEVICE — PACK EXTREMITY LATEX FREE SOP32HFFCS

## (undated) DEVICE — GLOVE PROTEXIS BLUE W/NEU-THERA 8.0  2D73EB80

## (undated) DEVICE — SU ETHILON 4-0 FS-1 1629H

## (undated) DEVICE — GOWN XLG DISP 9545

## (undated) RX ORDER — GABAPENTIN 300 MG/1
CAPSULE ORAL
Status: DISPENSED
Start: 2022-03-08

## (undated) RX ORDER — ACETAMINOPHEN 325 MG/1
TABLET ORAL
Status: DISPENSED
Start: 2022-03-08

## (undated) RX ORDER — LIDOCAINE HYDROCHLORIDE 10 MG/ML
INJECTION, SOLUTION INFILTRATION; PERINEURAL
Status: DISPENSED
Start: 2022-03-08